# Patient Record
Sex: FEMALE | Race: OTHER | HISPANIC OR LATINO | URBAN - METROPOLITAN AREA
[De-identification: names, ages, dates, MRNs, and addresses within clinical notes are randomized per-mention and may not be internally consistent; named-entity substitution may affect disease eponyms.]

---

## 2021-03-30 ENCOUNTER — OUTPATIENT (OUTPATIENT)
Dept: INPATIENT UNIT | Facility: HOSPITAL | Age: 31
LOS: 1 days | End: 2021-03-30
Payer: MEDICAID

## 2021-03-30 VITALS
SYSTOLIC BLOOD PRESSURE: 123 MMHG | HEART RATE: 120 BPM | DIASTOLIC BLOOD PRESSURE: 72 MMHG | TEMPERATURE: 99 F | RESPIRATION RATE: 20 BRPM

## 2021-03-30 VITALS — SYSTOLIC BLOOD PRESSURE: 109 MMHG | HEART RATE: 82 BPM | DIASTOLIC BLOOD PRESSURE: 71 MMHG

## 2021-03-30 DIAGNOSIS — O47.1 FALSE LABOR AT OR AFTER 37 COMPLETED WEEKS OF GESTATION: ICD-10-CM

## 2021-03-30 PROCEDURE — 59025 FETAL NON-STRESS TEST: CPT

## 2021-03-30 PROCEDURE — G0463: CPT

## 2021-03-30 NOTE — OB PROVIDER TRIAGE NOTE - NSHPPHYSICALEXAM_GEN_ALL_CORE
T(C): 37.0 (03-30-21 @ 05:11), Max: 37 (03-30-21 @ 05:07)  HR: 104 (03-30-21 @ 05:33) (94 - 120)  BP: 123/72 (03-30-21 @ 05:11) (123/72 - 123/72)  RR: 18 (03-30-21 @ 05:11) (18 - 20)  SpO2: 98% (03-30-21 @ 05:33) (97% - 98%)    Gen: NAD, well-appearing  Heart: RRR  Lungs: CTAB  Abd: soft, gravid  Ext: non-edematous, non-tender   SVE: 2/30/-3  Bedside sono: vertex, posterior placenta, CASE 9.29, gross fetal movement, fetal breathing   FHT: baseline 140s, moderate variability, -accels, -decels   [place]: uterine irritability

## 2021-03-30 NOTE — OB PROVIDER TRIAGE NOTE - NSOBPROVIDERNOTE_OBGYN_ALL_OB_FT
A/P: 31y  at 37.2 weeks GA who is being evaluated for rule out labor.   -Tachycardic, but otherwise VSS  -SVE /-3, will re-examine in 2 hours  -CASE 9.29  -Uterine irritability on toco  -PO hydration, declines analgesia at this time     Dispo: Continue to observe.     Discussed with Dr. Loving. A/P: 31y  at 37.2 weeks GA who is being evaluated for rule out labor.   -Tachycardic, but otherwise VSS  -SVE /-3, unchanged after 1 hour   -Reactive NST, CASE 9.29  -Uterine irritability on toco  -PO hydration, declines analgesia at this time     Dispo: Patient is stable for discharge to home with outpatient follow up on Thursday. Differential diagnoses discussed with the patient. Return precautions given. Patient verbalized understanding and agreement with plan.     Discussed with Dr. Loving.

## 2021-03-30 NOTE — OB PROVIDER TRIAGE NOTE - HISTORY OF PRESENT ILLNESS
RISA AGUILAR is a 31y  at 37.2 weeks GA who presents to L&D for contractions. Contractions began on Friday, have been increasing in intensity, but not frequency, and are happening irregularly.  Pt denies vaginal bleeding and leakage of fluid. She endorses good fetal movement. She denies fevers, chills, nausea, vomiting.   Pregnancy course is  uncomplicated.     POB:   G1 02, full term uncomplicated    G2 17, full term uncomplicated    PGYN: -fibroids/-cysts, denied STD hx, denies abnormal PAPs  PMH: Denies  PSH: Denies  SH: Denies tobacco use, EtOH use and illicit drug use during the pregnancy; Feels safe at home  Meds: PNVs   All: NKDA

## 2021-04-10 ENCOUNTER — OUTPATIENT (OUTPATIENT)
Dept: OUTPATIENT SERVICES | Facility: HOSPITAL | Age: 31
LOS: 1 days | End: 2021-04-10
Payer: MEDICAID

## 2021-04-10 VITALS
SYSTOLIC BLOOD PRESSURE: 124 MMHG | TEMPERATURE: 99 F | DIASTOLIC BLOOD PRESSURE: 82 MMHG | HEART RATE: 88 BPM | RESPIRATION RATE: 20 BRPM

## 2021-04-10 VITALS — DIASTOLIC BLOOD PRESSURE: 65 MMHG | HEART RATE: 110 BPM | SYSTOLIC BLOOD PRESSURE: 112 MMHG

## 2021-04-10 DIAGNOSIS — O47.1 FALSE LABOR AT OR AFTER 37 COMPLETED WEEKS OF GESTATION: ICD-10-CM

## 2021-04-10 PROCEDURE — G0463: CPT

## 2021-04-10 PROCEDURE — 59025 FETAL NON-STRESS TEST: CPT

## 2021-04-10 RX ORDER — MICONAZOLE NITRATE 2 %
1 CREAM (GRAM) TOPICAL
Qty: 15 | Refills: 0
Start: 2021-04-10 | End: 2021-04-12

## 2021-04-10 NOTE — OB PROVIDER TRIAGE NOTE - NSOBPROVIDERNOTE_OBGYN_ALL_OB_FT
RISA AGUILAR is a 31y  at 38w6d GA; r/o ROM, r/o labor    -SVE virtually unchanged from last triage visit 10 days prior and mild pain with irregular contractions; not likely in labor  -nitrizine negative and with thick discharge likely yeast infection; monistat 3 sent to pharmacy  -has f/u in clinic tomorrow  for monitoring and scheduling of induction of labor   -dispo: home with precautions    discussed with attending Dr Chavez

## 2021-04-10 NOTE — OB PROVIDER TRIAGE NOTE - HISTORY OF PRESENT ILLNESS
RISA AGUILAR is a 31y  at 38w6d GA who presents to L&D for contractions as well as leakage of fluid. Contractions are irregular, started last night around 6pm, pain 3/10. Believes she may have lost her mucus plug overnight, with some leakage of clear fluid and with thick white discharge.    Pt denies vaginal bleeding. She endorses good fetal movement. She denies fevers, chills, nausea, vomiting.     Pregnancy course is  uncomplicated.     POB:   G1 02, full term uncomplicated    G2 17, full term uncomplicated      PGYN: -fibroids/-cysts, denied STD hx, denies abnormal PAPs  PMH: Denies  PSH: Denies  SH: Denies tobacco use, EtOH use and illicit drug use during the pregnancy; Feels safe at home  Meds: PNVs   All: NKDA      Vital Signs Last 24 Hrs  T(C): 37.1 (10 Apr 2021 19:32), Max: 37.1 (10 Apr 2021 19:26)  T(F): 98.78 (10 Apr 2021 19:32), Max: 98.8 (10 Apr 2021 19:26)  HR: 110 (10 Apr 2021 20:08) (88 - 110)  BP: 112/65 (10 Apr 2021 20:08) (112/65 - 124/82)  RR: 20 (10 Apr 2021 19:32) (20 - 20)    Gen: no acute distress  CV: regular rate and ryhthmn   Pulm: clear bilaterally to auscultation  Abd: nontender; gravid  Pelvic: thick white discharge in vaginal vault; scant fluid without leakage upon valsalva; nitrizine negative  Ext: no calf tenderness; +1 swelling     Tracing: baseline 150, moderate variability, +accels, no decels  [place]: Ctx q3-6 minutes  SVE: 1-2/30/-3

## 2021-04-11 ENCOUNTER — TRANSCRIPTION ENCOUNTER (OUTPATIENT)
Age: 31
End: 2021-04-11

## 2021-04-11 ENCOUNTER — INPATIENT (INPATIENT)
Facility: HOSPITAL | Age: 31
LOS: 1 days | Discharge: HOME | End: 2021-04-13
Attending: OBSTETRICS & GYNECOLOGY | Admitting: OBSTETRICS & GYNECOLOGY
Payer: MEDICAID

## 2021-04-11 VITALS
SYSTOLIC BLOOD PRESSURE: 133 MMHG | RESPIRATION RATE: 18 BRPM | TEMPERATURE: 98 F | DIASTOLIC BLOOD PRESSURE: 84 MMHG | WEIGHT: 190.92 LBS | HEART RATE: 97 BPM | HEIGHT: 64 IN

## 2021-04-11 DIAGNOSIS — O47.1 FALSE LABOR AT OR AFTER 37 COMPLETED WEEKS OF GESTATION: ICD-10-CM

## 2021-04-11 PROBLEM — Z78.9 OTHER SPECIFIED HEALTH STATUS: Chronic | Status: ACTIVE | Noted: 2021-03-30

## 2021-04-11 LAB
ABO RH CONFIRMATION: SIGNIFICANT CHANGE UP
BASOPHILS # BLD AUTO: 0.06 K/UL — SIGNIFICANT CHANGE UP (ref 0–0.2)
BASOPHILS NFR BLD AUTO: 0.6 % — SIGNIFICANT CHANGE UP (ref 0–2)
BLD GP AB SCN SERPL QL: SIGNIFICANT CHANGE UP
COVID-19 SPIKE DOMAIN AB INTERP: POSITIVE
COVID-19 SPIKE DOMAIN ANTIBODY RESULT: 171 U/ML — HIGH
EOSINOPHIL # BLD AUTO: 0.41 K/UL — SIGNIFICANT CHANGE UP (ref 0–0.5)
EOSINOPHIL NFR BLD AUTO: 4.3 % — SIGNIFICANT CHANGE UP (ref 0–6)
HCT VFR BLD CALC: 34 % — LOW (ref 34.5–45)
HCT VFR BLD CALC: 35.3 % — SIGNIFICANT CHANGE UP (ref 34.5–45)
HGB BLD-MCNC: 10.8 G/DL — LOW (ref 11.5–15.5)
HGB BLD-MCNC: 10.9 G/DL — LOW (ref 11.5–15.5)
HIV 1 & 2 AB SERPL IA.RAPID: SIGNIFICANT CHANGE UP
HIV 1+2 AB+HIV1 P24 AG SERPL QL IA: SIGNIFICANT CHANGE UP
IMM GRANULOCYTES NFR BLD AUTO: 0.3 % — SIGNIFICANT CHANGE UP (ref 0–1.5)
LYMPHOCYTES # BLD AUTO: 2.52 K/UL — SIGNIFICANT CHANGE UP (ref 1–3.3)
LYMPHOCYTES # BLD AUTO: 26.3 % — SIGNIFICANT CHANGE UP (ref 13–44)
MCHC RBC-ENTMCNC: 24.4 PG — LOW (ref 27–34)
MCHC RBC-ENTMCNC: 24.5 PG — LOW (ref 27–34)
MCHC RBC-ENTMCNC: 30.9 GM/DL — LOW (ref 32–36)
MCHC RBC-ENTMCNC: 31.8 GM/DL — LOW (ref 32–36)
MCV RBC AUTO: 76.9 FL — LOW (ref 80–100)
MCV RBC AUTO: 79.3 FL — LOW (ref 80–100)
MONOCYTES # BLD AUTO: 0.72 K/UL — SIGNIFICANT CHANGE UP (ref 0–0.9)
MONOCYTES NFR BLD AUTO: 7.5 % — SIGNIFICANT CHANGE UP (ref 2–14)
NEUTROPHILS # BLD AUTO: 5.85 K/UL — SIGNIFICANT CHANGE UP (ref 1.8–7.4)
NEUTROPHILS NFR BLD AUTO: 61 % — SIGNIFICANT CHANGE UP (ref 43–77)
PLATELET # BLD AUTO: 392 K/UL — SIGNIFICANT CHANGE UP (ref 150–400)
PLATELET # BLD AUTO: 400 K/UL — SIGNIFICANT CHANGE UP (ref 150–400)
RBC # BLD: 4.42 M/UL — SIGNIFICANT CHANGE UP (ref 3.8–5.2)
RBC # BLD: 4.45 M/UL — SIGNIFICANT CHANGE UP (ref 3.8–5.2)
RBC # FLD: 13.7 % — SIGNIFICANT CHANGE UP (ref 10.3–14.5)
RBC # FLD: 13.8 % — SIGNIFICANT CHANGE UP (ref 10.3–14.5)
SARS-COV-2 IGG+IGM SERPL QL IA: 171 U/ML — HIGH
SARS-COV-2 IGG+IGM SERPL QL IA: POSITIVE
SARS-COV-2 RNA SPEC QL NAA+PROBE: SIGNIFICANT CHANGE UP
WBC # BLD: 12.61 K/UL — HIGH (ref 3.8–10.5)
WBC # BLD: 9.59 K/UL — SIGNIFICANT CHANGE UP (ref 3.8–10.5)
WBC # FLD AUTO: 12.61 K/UL — HIGH (ref 3.8–10.5)
WBC # FLD AUTO: 9.59 K/UL — SIGNIFICANT CHANGE UP (ref 3.8–10.5)

## 2021-04-11 RX ORDER — OXYCODONE HYDROCHLORIDE 5 MG/1
5 TABLET ORAL ONCE
Refills: 0 | Status: DISCONTINUED | OUTPATIENT
Start: 2021-04-11 | End: 2021-04-13

## 2021-04-11 RX ORDER — OXYTOCIN 10 UNIT/ML
333.33 VIAL (ML) INJECTION
Qty: 20 | Refills: 0 | Status: DISCONTINUED | OUTPATIENT
Start: 2021-04-11 | End: 2021-04-13

## 2021-04-11 RX ORDER — CITRIC ACID/SODIUM CITRATE 300-500 MG
30 SOLUTION, ORAL ORAL ONCE
Refills: 0 | Status: DISCONTINUED | OUTPATIENT
Start: 2021-04-11 | End: 2021-04-11

## 2021-04-11 RX ORDER — DIBUCAINE 1 %
1 OINTMENT (GRAM) RECTAL EVERY 6 HOURS
Refills: 0 | Status: DISCONTINUED | OUTPATIENT
Start: 2021-04-11 | End: 2021-04-13

## 2021-04-11 RX ORDER — LANOLIN
1 OINTMENT (GRAM) TOPICAL EVERY 6 HOURS
Refills: 0 | Status: DISCONTINUED | OUTPATIENT
Start: 2021-04-11 | End: 2021-04-13

## 2021-04-11 RX ORDER — HYDROCORTISONE 1 %
1 OINTMENT (GRAM) TOPICAL EVERY 6 HOURS
Refills: 0 | Status: DISCONTINUED | OUTPATIENT
Start: 2021-04-11 | End: 2021-04-13

## 2021-04-11 RX ORDER — SODIUM CHLORIDE 9 MG/ML
3 INJECTION INTRAMUSCULAR; INTRAVENOUS; SUBCUTANEOUS EVERY 8 HOURS
Refills: 0 | Status: DISCONTINUED | OUTPATIENT
Start: 2021-04-11 | End: 2021-04-13

## 2021-04-11 RX ORDER — ACETAMINOPHEN 500 MG
975 TABLET ORAL
Refills: 0 | Status: DISCONTINUED | OUTPATIENT
Start: 2021-04-11 | End: 2021-04-13

## 2021-04-11 RX ORDER — OXYTOCIN 10 UNIT/ML
333.33 VIAL (ML) INJECTION
Qty: 20 | Refills: 0 | Status: DISCONTINUED | OUTPATIENT
Start: 2021-04-11 | End: 2021-04-11

## 2021-04-11 RX ORDER — OXYCODONE HYDROCHLORIDE 5 MG/1
5 TABLET ORAL
Refills: 0 | Status: DISCONTINUED | OUTPATIENT
Start: 2021-04-11 | End: 2021-04-13

## 2021-04-11 RX ORDER — KETOROLAC TROMETHAMINE 30 MG/ML
30 SYRINGE (ML) INJECTION ONCE
Refills: 0 | Status: DISCONTINUED | OUTPATIENT
Start: 2021-04-11 | End: 2021-04-11

## 2021-04-11 RX ORDER — IBUPROFEN 200 MG
1 TABLET ORAL
Qty: 40 | Refills: 0
Start: 2021-04-11 | End: 2021-04-20

## 2021-04-11 RX ORDER — BENZOCAINE 10 %
1 GEL (GRAM) MUCOUS MEMBRANE EVERY 6 HOURS
Refills: 0 | Status: DISCONTINUED | OUTPATIENT
Start: 2021-04-11 | End: 2021-04-13

## 2021-04-11 RX ORDER — ACETAMINOPHEN 500 MG
3 TABLET ORAL
Qty: 120 | Refills: 0
Start: 2021-04-11 | End: 2021-04-20

## 2021-04-11 RX ORDER — IBUPROFEN 200 MG
600 TABLET ORAL EVERY 6 HOURS
Refills: 0 | Status: DISCONTINUED | OUTPATIENT
Start: 2021-04-11 | End: 2021-04-13

## 2021-04-11 RX ORDER — PRAMOXINE HYDROCHLORIDE 150 MG/15G
1 AEROSOL, FOAM RECTAL EVERY 4 HOURS
Refills: 0 | Status: DISCONTINUED | OUTPATIENT
Start: 2021-04-11 | End: 2021-04-13

## 2021-04-11 RX ORDER — IBUPROFEN 200 MG
600 TABLET ORAL EVERY 6 HOURS
Refills: 0 | Status: COMPLETED | OUTPATIENT
Start: 2021-04-11 | End: 2022-03-10

## 2021-04-11 RX ORDER — JNJ-78436735 50000000000 [PFU]/.5ML
0.5 SUSPENSION INTRAMUSCULAR ONCE
Refills: 0 | Status: DISCONTINUED | OUTPATIENT
Start: 2021-04-11 | End: 2021-04-13

## 2021-04-11 RX ORDER — DIPHENHYDRAMINE HCL 50 MG
25 CAPSULE ORAL EVERY 6 HOURS
Refills: 0 | Status: DISCONTINUED | OUTPATIENT
Start: 2021-04-11 | End: 2021-04-13

## 2021-04-11 RX ORDER — MAGNESIUM HYDROXIDE 400 MG/1
30 TABLET, CHEWABLE ORAL
Refills: 0 | Status: DISCONTINUED | OUTPATIENT
Start: 2021-04-11 | End: 2021-04-13

## 2021-04-11 RX ORDER — SIMETHICONE 80 MG/1
80 TABLET, CHEWABLE ORAL EVERY 4 HOURS
Refills: 0 | Status: DISCONTINUED | OUTPATIENT
Start: 2021-04-11 | End: 2021-04-13

## 2021-04-11 RX ORDER — AER TRAVELER 0.5 G/1
1 SOLUTION RECTAL; TOPICAL EVERY 4 HOURS
Refills: 0 | Status: DISCONTINUED | OUTPATIENT
Start: 2021-04-11 | End: 2021-04-13

## 2021-04-11 RX ORDER — SODIUM CHLORIDE 9 MG/ML
1000 INJECTION, SOLUTION INTRAVENOUS
Refills: 0 | Status: DISCONTINUED | OUTPATIENT
Start: 2021-04-11 | End: 2021-04-11

## 2021-04-11 RX ORDER — TETANUS TOXOID, REDUCED DIPHTHERIA TOXOID AND ACELLULAR PERTUSSIS VACCINE, ADSORBED 5; 2.5; 8; 8; 2.5 [IU]/.5ML; [IU]/.5ML; UG/.5ML; UG/.5ML; UG/.5ML
0.5 SUSPENSION INTRAMUSCULAR ONCE
Refills: 0 | Status: DISCONTINUED | OUTPATIENT
Start: 2021-04-11 | End: 2021-04-13

## 2021-04-11 RX ADMIN — Medication 600 MILLIGRAM(S): at 13:00

## 2021-04-11 RX ADMIN — Medication 1000 MILLIUNIT(S)/MIN: at 05:15

## 2021-04-11 RX ADMIN — Medication 1 TABLET(S): at 13:00

## 2021-04-11 RX ADMIN — SODIUM CHLORIDE 3 MILLILITER(S): 9 INJECTION INTRAMUSCULAR; INTRAVENOUS; SUBCUTANEOUS at 15:00

## 2021-04-11 RX ADMIN — Medication 975 MILLIGRAM(S): at 21:06

## 2021-04-11 RX ADMIN — Medication 30 MILLIGRAM(S): at 05:30

## 2021-04-11 RX ADMIN — Medication 975 MILLIGRAM(S): at 22:00

## 2021-04-11 RX ADMIN — SODIUM CHLORIDE 125 MILLILITER(S): 9 INJECTION, SOLUTION INTRAVENOUS at 05:15

## 2021-04-11 RX ADMIN — Medication 975 MILLIGRAM(S): at 09:25

## 2021-04-11 RX ADMIN — Medication 600 MILLIGRAM(S): at 14:00

## 2021-04-11 RX ADMIN — Medication 30 MILLIGRAM(S): at 05:13

## 2021-04-11 RX ADMIN — SODIUM CHLORIDE 3 MILLILITER(S): 9 INJECTION INTRAMUSCULAR; INTRAVENOUS; SUBCUTANEOUS at 21:06

## 2021-04-11 RX ADMIN — Medication 975 MILLIGRAM(S): at 08:25

## 2021-04-11 NOTE — DISCHARGE NOTE OB - CARE PROVIDER_API CALL
[facility],   [address] [place] [id number]  Phone: [phone]  Fax: [phone]  Follow Up Time:

## 2021-04-11 NOTE — OB PROVIDER H&P - ASSESSMENT
31y  at 39w GA presenting to L&D in active labor    -Admit to L&D  -Consent  -Admission labs  -IV fluids  -Continuous toco and fetal monitoring   -GBS: Negative, no GBS ppx required   -Analgesia: requesting epidural  -expectant management at this time; augment if needed    Discussed with Dr. Chavez

## 2021-04-11 NOTE — OB RN DELIVERY SUMMARY - NS_SEPSISRSKCALC_OBGYN_ALL_OB_FT
EOS calculated successfully. EOS Risk Factor: 0.5/1000 live births ([facility] national incidence); GA=39w;Temp=98.1; ROM=0.083; GBS='Negative'; Antibiotics='No antibiotics or any antibiotics < 2 hrs prior to birth'

## 2021-04-11 NOTE — DISCHARGE NOTE OB - CARE PLAN
Principal Discharge DX:	 (normal spontaneous vaginal delivery)  Goal:	Rapid recovery  Assessment and plan of treatment:	Patient should transition to regular activity level. Resume regular diet. Patient should follow up with her OB for a postpartum checkup 3-6 weeks after delivery. Patient should call her doctor sooner if she develops a fever or uncontrolled vaginal bleeding. Please call sooner if there are any other concerns.  Secondary Diagnosis:	Status post bilateral salpingectomy  Goal:	Rapid recovery  Assessment and plan of treatment:	Please call your provider in 1 week for wound check. Take medications as directed, regular diet, activity as tolerated. Exclusive breast feeding for the first 6 months is recommended. Nothing per vagina for 6 weeks (incl. sex, douching, etc). If you have additional concerns, please inform your provider.

## 2021-04-11 NOTE — DISCHARGE NOTE OB - PROVIDER TOKENS
FREE:[LAST:[[facility]],PHONE:[phone]:[phone]],ADDRESS:[[address] [place] [id number]]]

## 2021-04-11 NOTE — OB PROVIDER H&P - HISTORY OF PRESENT ILLNESS
RISA AGUILAR is a 31y  at 39w GA who presents to L&D for regular strong contractions 10/10 pain; Denies vaginal bleeding or fluid loss. + fetal movement, unchanged.  had been triaged 12 hours prior for contractions as well as leakage of fluid, both labor and rupture ruled out at that time.    She denies fevers, chills, nausea, vomiting.     Pregnancy course is  uncomplicated.     POB:   G1 02, full term uncomplicated    G2 17, full term uncomplicated      PGYN: -fibroids/-cysts, denied STD hx, denies abnormal PAPs  PMH: Denies  PSH: Denies  SH: Denies tobacco use, EtOH use and illicit drug use during the pregnancy; Feels safe at home  Meds: PNVs   All: NKDA

## 2021-04-11 NOTE — DISCHARGE NOTE OB - PATIENT PORTAL LINK FT
You can access the FollowMyHealth Patient Portal offered by [facility] by registering at the following website: [email or web address]. By joining [employer]s FollowMyHealth portal, you will also be able to view your health information using other applications (apps) compatible with our system.

## 2021-04-11 NOTE — OB PROVIDER H&P - ATTENDING COMMENTS
patient is 40 weeks with active labor  patient with uncomplicated prenatal care  fetal heart rate reactive with regular contractions  cervix 6cm and intact patient is 39 weeks with active labor  patient with uncomplicated prenatal care  fetal heart rate reactive with regular contractions  cervix 6cm and intact

## 2021-04-11 NOTE — OB RN PATIENT PROFILE - MATERNAL MARITAL STATUS, OB PROFILE
Patient presents to the clinic for diabetic follow-up.     Previous A1C is at goal of <8    Lab Results   Component Value Date    A1C 6.8 04/03/2019       Patient has Type 2 Diabetes  Diabetes medications: Oral Medications: Metformin - Dose: 500 MG, Time: breakfast and supper  Patient is on a daily aspirin  Patient is on a Statin.  Blood glucose tests per day 1-2 times weekly  Urine microalbumin:creatine: 04/03/2019  Foot exam DUE TODAY  Eye exam 06/07/2018    Blood pressure today of 136/72 is at the goal of <139/89 for diabetics.    Tobacco User:   History   Smoking Status     Former Smoker     Quit date: 10/1/2007   Smokeless Tobacco     Never Used     Comment: Patient occasionally uses Nicorette gum       Other concerns today:Multiple     Medication Reconciliation: complete     Frances Richardson LPN on 4/17/2019 at 2:51 PMPatient presents to the clinic for annual visit, medication discussion and review.     Medication Reconciliation: complete   Frances Richardson LPN............. April 17, 2019 2:43 PM             partnered

## 2021-04-11 NOTE — OB PROVIDER DELIVERY SUMMARY - NSPROVIDERDELIVERYNOTE_OBGYN_ALL_OB_FT
Patient felt rectal pressure and was found to be 9cm, 0 station.  AROM approx 450AM  She pushed effectively for 5 minutes.  Patient prepped and draped for delivery.  In conjunction with maternal effort, she delivered a viable female infant at 455.  Head delivered ROGE   No nuchal cord.   shoulders and body then delivered without difficulty.  cord clamped x2 and cut. Cord blood also collected for blood type.  Placenta delivered spontaneously and intact at 0457. No gross pathology, 3 vessel cord. Excellent hemostasis was achieved.  Perineum and vagina were inspected and a right periurethral laceration was noted and repaired with 3-0 chromic suture. Excellent hemostasis obtained,  EBL 16 cc, no complications.    : female, Apgars 9/9.

## 2021-04-11 NOTE — DISCHARGE NOTE OB - HOSPITAL COURSE
She is a 32yo now  who presented at 39 GA in active labor and had a normal delivery. She received a bilateral salpingectomy on PPD#1. She had a normal postpartum course and was discharged home in stable condition. Upon discharge she was voiding, tolerating PO, and ambulating.

## 2021-04-11 NOTE — OB PROVIDER H&P - NSHPPHYSICALEXAM_GEN_ALL_CORE
Vital Signs Last 24 Hrs  T(C): 37.1 (10 Apr 2021 19:32), Max: 37.1 (10 Apr 2021 19:26)  T(F): 98.78 (10 Apr 2021 19:32), Max: 98.8 (10 Apr 2021 19:26)  HR: 110 (10 Apr 2021 20:08) (88 - 110)  BP: 112/65 (10 Apr 2021 20:08) (112/65 - 124/82)  RR: 20 (10 Apr 2021 19:32) (20 - 20)    Gen: no acute distress  CV: regular rate and ryhthmn   Pulm: clear bilaterally to auscultation  Abd: nontender; gravid  Ext: no calf tenderness; +1 swelling     Tracing: baseline 140, moderate variability, no accels, no decels  [place]: Ctx q2-3 minutes  SVE: 6/90/-1

## 2021-04-11 NOTE — DISCHARGE NOTE OB - PLAN OF CARE
Rapid recovery Patient should transition to regular activity level. Resume regular diet. Patient should follow up with her OB for a postpartum checkup 3-6 weeks after delivery. Patient should call her doctor sooner if she develops a fever or uncontrolled vaginal bleeding. Please call sooner if there are any other concerns. Please call your provider in 1 week for wound check. Take medications as directed, regular diet, activity as tolerated. Exclusive breast feeding for the first 6 months is recommended. Nothing per vagina for 6 weeks (incl. sex, douching, etc). If you have additional concerns, please inform your provider.

## 2021-04-11 NOTE — DISCHARGE NOTE OB - MEDICATION SUMMARY - MEDICATIONS TO TAKE
I will START or STAY ON the medications listed below when I get home from the hospital:    acetaminophen 325 mg oral tablet  -- 3 tab(s) by mouth every 6 hours, As Needed -for mild pain MDD:4,000 mg  -- Indication: For moderate pain    ibuprofen 600 mg oral tablet  -- 1 tab(s) by mouth every 6 hours, As Needed -for mild pain   -- Indication: For moderate pain    Prenatal Multivitamins with Folic Acid 1 mg oral tablet  -- 1 tab(s) by mouth once a day  -- Indication: For postpartum recovery

## 2021-04-12 LAB
MEV IGG SER-ACNC: 45.7 AU/ML — SIGNIFICANT CHANGE UP
MEV IGG+IGM SER-IMP: POSITIVE — SIGNIFICANT CHANGE UP
T PALLIDUM AB TITR SER: NEGATIVE — SIGNIFICANT CHANGE UP

## 2021-04-12 RX ORDER — ONDANSETRON 8 MG/1
4 TABLET, FILM COATED ORAL ONCE
Refills: 0 | Status: DISCONTINUED | OUTPATIENT
Start: 2021-04-12 | End: 2021-04-13

## 2021-04-12 RX ORDER — FENTANYL CITRATE 50 UG/ML
25 INJECTION INTRAVENOUS
Refills: 0 | Status: DISCONTINUED | OUTPATIENT
Start: 2021-04-12 | End: 2021-04-12

## 2021-04-12 RX ORDER — CITRIC ACID/SODIUM CITRATE 300-500 MG
30 SOLUTION, ORAL ORAL ONCE
Refills: 0 | Status: COMPLETED | OUTPATIENT
Start: 2021-04-12 | End: 2021-04-12

## 2021-04-12 RX ORDER — SODIUM CHLORIDE 9 MG/ML
1000 INJECTION, SOLUTION INTRAVENOUS
Refills: 0 | Status: DISCONTINUED | OUTPATIENT
Start: 2021-04-12 | End: 2021-04-13

## 2021-04-12 RX ORDER — SODIUM CHLORIDE 9 MG/ML
1000 INJECTION, SOLUTION INTRAVENOUS ONCE
Refills: 0 | Status: COMPLETED | OUTPATIENT
Start: 2021-04-12 | End: 2021-04-12

## 2021-04-12 RX ADMIN — Medication 600 MILLIGRAM(S): at 18:10

## 2021-04-12 RX ADMIN — FENTANYL CITRATE 25 MICROGRAM(S): 50 INJECTION INTRAVENOUS at 12:08

## 2021-04-12 RX ADMIN — Medication 975 MILLIGRAM(S): at 03:22

## 2021-04-12 RX ADMIN — OXYCODONE HYDROCHLORIDE 5 MILLIGRAM(S): 5 TABLET ORAL at 16:34

## 2021-04-12 RX ADMIN — SODIUM CHLORIDE 3 MILLILITER(S): 9 INJECTION INTRAMUSCULAR; INTRAVENOUS; SUBCUTANEOUS at 21:46

## 2021-04-12 RX ADMIN — Medication 975 MILLIGRAM(S): at 21:45

## 2021-04-12 RX ADMIN — SODIUM CHLORIDE 3 MILLILITER(S): 9 INJECTION INTRAMUSCULAR; INTRAVENOUS; SUBCUTANEOUS at 06:30

## 2021-04-12 RX ADMIN — Medication 600 MILLIGRAM(S): at 18:40

## 2021-04-12 RX ADMIN — Medication 30 MILLILITER(S): at 08:10

## 2021-04-12 RX ADMIN — SODIUM CHLORIDE 1000 MILLILITER(S): 9 INJECTION, SOLUTION INTRAVENOUS at 07:30

## 2021-04-12 RX ADMIN — SODIUM CHLORIDE 3 MILLILITER(S): 9 INJECTION INTRAMUSCULAR; INTRAVENOUS; SUBCUTANEOUS at 15:16

## 2021-04-12 RX ADMIN — FENTANYL CITRATE 25 MICROGRAM(S): 50 INJECTION INTRAVENOUS at 11:15

## 2021-04-12 RX ADMIN — SODIUM CHLORIDE 125 MILLILITER(S): 9 INJECTION, SOLUTION INTRAVENOUS at 11:21

## 2021-04-12 RX ADMIN — Medication 600 MILLIGRAM(S): at 06:25

## 2021-04-12 RX ADMIN — FENTANYL CITRATE 25 MICROGRAM(S): 50 INJECTION INTRAVENOUS at 11:59

## 2021-04-12 RX ADMIN — Medication 975 MILLIGRAM(S): at 22:30

## 2021-04-12 RX ADMIN — FENTANYL CITRATE 25 MICROGRAM(S): 50 INJECTION INTRAVENOUS at 11:51

## 2021-04-12 RX ADMIN — FENTANYL CITRATE 25 MICROGRAM(S): 50 INJECTION INTRAVENOUS at 11:06

## 2021-04-12 RX ADMIN — Medication 975 MILLIGRAM(S): at 04:00

## 2021-04-12 RX ADMIN — OXYCODONE HYDROCHLORIDE 5 MILLIGRAM(S): 5 TABLET ORAL at 16:04

## 2021-04-12 RX ADMIN — Medication 600 MILLIGRAM(S): at 06:55

## 2021-04-12 RX ADMIN — FENTANYL CITRATE 25 MICROGRAM(S): 50 INJECTION INTRAVENOUS at 11:16

## 2021-04-12 RX ADMIN — Medication 600 MILLIGRAM(S): at 23:53

## 2021-04-12 RX ADMIN — FENTANYL CITRATE 25 MICROGRAM(S): 50 INJECTION INTRAVENOUS at 11:19

## 2021-04-12 NOTE — OB RN INTRAOPERATIVE NOTE - NSOBSELHIDDEN_OBGYN_ALL_OB_FT
[NSOBAttendingProcedure1_OBGYN_ALL_OB_FT:[id number]],[NSRNCirculatorProcedure1_OBGYN_ALL_OB_FT:[id number]=]

## 2021-04-12 NOTE — OB RN INTRAOPERATIVE NOTE - NSELECTROSURGICALUNITBIOMEDNUMBER1_OBGYN_ALL_OB_FT
Called and spoke with patient about PT and OT orders.  Informed patient that the [place] location does not offer driving tests, however, [place] and [place] locations do.  Patient informed he may complete his OT and PT here if he prefers.  Patient states he would prefer [place].  Patient offered to schedule evaluations at time of call, but states he'd rather have his wife call.  Patient given clinic number.  
[phone]

## 2021-04-12 NOTE — PROGRESS NOTE ADULT - ATTENDING COMMENTS
Post  day # 1  satisfied parity  no complaints  exam wnl  hb 10    Plan  NPo for BTL  other routine care  discussed vaccination, breastfeeding

## 2021-04-12 NOTE — PROGRESS NOTE ADULT - ASSESSMENT
A/P:  RISA AGUILAR is a 31y  s/p  PPD# 1 of a viable female infant.  - VSS.   - Pain is well controlled  - Tolerating PO intake: NPO for tubal  - Normal bowel function  - Bleeding wnl  - Rh +  - Dispo: Continued post partum care. For tubal today. A/P:  RISA AGUILAR is a 31y  s/p  PPD# 1 of a viable female infant.  - VSS.   - Pain is well controlled  - Tolerating PO intake: NPO for tubal  - Normal bowel function  - Bleeding wnl  - Rh +  - Dispo: Continued post partum care. For tubal today.      PGY4 Addendum: Patient seen and examined at bedside. Agree with the above

## 2021-04-13 ENCOUNTER — RESULT REVIEW (OUTPATIENT)
Age: 31
End: 2021-04-13

## 2021-04-13 VITALS
RESPIRATION RATE: 18 BRPM | OXYGEN SATURATION: 99 % | DIASTOLIC BLOOD PRESSURE: 75 MMHG | SYSTOLIC BLOOD PRESSURE: 128 MMHG | HEART RATE: 82 BPM | TEMPERATURE: 98 F

## 2021-04-13 PROCEDURE — 86765 RUBEOLA ANTIBODY: CPT

## 2021-04-13 PROCEDURE — 86901 BLOOD TYPING SEROLOGIC RH(D): CPT

## 2021-04-13 PROCEDURE — 86703 HIV-1/HIV-2 1 RESULT ANTBDY: CPT

## 2021-04-13 PROCEDURE — U0003: CPT

## 2021-04-13 PROCEDURE — 59050 FETAL MONITOR W/REPORT: CPT

## 2021-04-13 PROCEDURE — G0463: CPT

## 2021-04-13 PROCEDURE — 88302 TISSUE EXAM BY PATHOLOGIST: CPT

## 2021-04-13 PROCEDURE — 59025 FETAL NON-STRESS TEST: CPT

## 2021-04-13 PROCEDURE — 88302 TISSUE EXAM BY PATHOLOGIST: CPT | Mod: 26

## 2021-04-13 PROCEDURE — 86780 TREPONEMA PALLIDUM: CPT

## 2021-04-13 PROCEDURE — 86900 BLOOD TYPING SEROLOGIC ABO: CPT

## 2021-04-13 PROCEDURE — U0005: CPT

## 2021-04-13 PROCEDURE — 86769 SARS-COV-2 COVID-19 ANTIBODY: CPT

## 2021-04-13 PROCEDURE — 36415 COLL VENOUS BLD VENIPUNCTURE: CPT

## 2021-04-13 PROCEDURE — 86850 RBC ANTIBODY SCREEN: CPT

## 2021-04-13 PROCEDURE — 85027 COMPLETE CBC AUTOMATED: CPT

## 2021-04-13 PROCEDURE — 87389 HIV-1 AG W/HIV-1&-2 AB AG IA: CPT

## 2021-04-13 PROCEDURE — 85025 COMPLETE CBC W/AUTO DIFF WBC: CPT

## 2021-04-13 RX ADMIN — Medication 600 MILLIGRAM(S): at 12:01

## 2021-04-13 RX ADMIN — Medication 600 MILLIGRAM(S): at 06:45

## 2021-04-13 RX ADMIN — Medication 1 TABLET(S): at 12:02

## 2021-04-13 RX ADMIN — Medication 975 MILLIGRAM(S): at 09:00

## 2021-04-13 RX ADMIN — Medication 600 MILLIGRAM(S): at 06:10

## 2021-04-13 RX ADMIN — Medication 975 MILLIGRAM(S): at 04:15

## 2021-04-13 RX ADMIN — Medication 600 MILLIGRAM(S): at 12:29

## 2021-04-13 RX ADMIN — Medication 975 MILLIGRAM(S): at 10:00

## 2021-04-13 RX ADMIN — Medication 975 MILLIGRAM(S): at 03:35

## 2021-04-13 RX ADMIN — Medication 600 MILLIGRAM(S): at 00:30

## 2021-04-13 NOTE — PROGRESS NOTE ADULT - ASSESSMENT
A/P:  IRSA AGUILAR is a 31y  s/p  PPD# 2 of a viable female infant. s/P bilateral salpingectomy POD#1.  - VSS.   - Pain is well controlled  - Tolerating PO intake  - Normal bowel function  - Bleeding wnl  - Rh +  - Dispo: Home pending attending approval.      A/P:  RISA AGUILAR is a 31y  s/p  PPD# 2 of a viable female infant. s/P bilateral salpingectomy POD#1.  - VSS.   - Pain is well controlled  - Tolerating PO intake  - Normal bowel function  - Bleeding wnl  - Rh +  - Dispo: Home pending attending approval.     PGY4 Addendum: Patient seen and examined at bedside. Agree with the above

## 2021-04-13 NOTE — PROGRESS NOTE ADULT - SUBJECTIVE AND OBJECTIVE BOX
Name: RISA AGUILAR  MRN: [id number]  Date Admitted: 21  Location: [facility]EST 2001 ([facility]  Attending: Pili Chavez Tia      Post Partum: Vaginal Delivery Progress Note    RISA AGUILAR is a 31y  s/p  PPD# 2 of a viable female infant. s/P bilateral salpingectomy POD#1.    SUBJECTIVE:  No acute events overnight. Pain is well controlled with PRN pain medication. No problems with ambulating, voiding, or PO intake. Has had flatus but no BM. Denies N/V. Patient is having normal lochia which is decreasing.    She is breastfeeding and the baby is latching on.     OBJECTIVE:    Vital Signs Last 24 Hrs  T(C): 36.7 (2021 03:40), Max: 37.1 (2021 12:45)  T(F): 98.1 (2021 03:40), Max: 98.7 (2021 12:45)  HR: 82 (2021 03:40) (59 - 99)  BP: 128/75 (2021 03:40) (110/62 - 140/74)  RR: 18 (2021 03:40) (14 - 19)  SpO2: 99% (2021 03:40) (97% - 100%)      Physical exam:  General: AOx3, NAD.  Heart: RRR. S1S2.  Lungs: CTABL. Good airflow bilaterally.   Abdomen: +BS, Soft, appropriately tender to palpitation, firm uterine fundus at umbilicus. Incision: C/D/I  Pelvic: Lochia normal  Ext: No DVT signs, warm extremities.        LABS:                        10.9   12.61 )-----------( 392      ( 2021 18:33 )             35.3               
Name: RISA AGUILAR  MRN: [id number]  Date Admitted: 21  Location: [facility]EST [facility] ([facility]  Attending: Pili Chavez Tia      Post Partum: Vaginal Delivery Progress Note    RISA AGUILAR is a 31y  s/p  PPD# 1 of a viable female infant.     SUBJECTIVE:  No acute events overnight. Pain is well controlled with PRN pain medication. No problems with ambulating, voiding, or PO intake. Has had flatus but no BM. Denies N/V. Patient is having normal lochia which is decreasing.    She is breastfeeding and the baby is latching on.     OBJECTIVE:    Vital Signs Last 24 Hrs  T(C): 37 (2021 04:04), Max: 37 (2021 04:04)  T(F): 98.6 (2021 04:04), Max: 98.6 (2021 04:04)  HR: 87 (2021 04:04) (72 - 98)  BP: 134/84 (2021 04:04) (116/76 - 149/87)  RR: 16 (2021 04:04) (16 - 18)  SpO2: 99% (2021 04:04) (98% - 99%)      Physical exam:  General: AOx3, NAD.  Heart: RRR. S1S2.  Lungs: CTABL. Good airflow bilaterally.   Abdomen: +BS, Soft, appropriately tender to palpitation, firm uterine fundus at umbilicus.  Pelvic: Lochia normal  Ext: No DVT signs, warm extremities.        LABS:                        10.9   12.61 )-----------( 392      ( 2021 18:33 )             35.3               
S: Patient doing well. Minimal lochia. No complaints.     O: Vital Signs Last 24 Hrs  T(C): 36.7 (2021 03:40), Max: 37.1 (2021 12:45)  T(F): 98.1 (2021 03:40), Max: 98.7 (2021 12:45)  HR: 82 (2021 03:40) (59 - 99)  BP: 128/75 (2021 03:40) (110/62 - 140/74)  BP(mean): --  RR: 18 (2021 03:40) (14 - 19)  SpO2: 99% (2021 03:40) (97% - 100%)    Gen: NAD  Breast : breast feeding  Abd: soft, NT, ND, fundus firm below umbilicus. Incision dry.  Lochia mild, voiding well  Ext: no tenderness    Labs:                        10.9   12.61 )-----------( 392      ( 2021 18:33 )             35.3            PP # 2 s/p     Doing well.  Discharge hometoday  Nothing in vagina and no heavy lifting for 6 weeks.   Follow up 1 weeks for post partum visit for incision check  Call office for any fevers, chills, heavy vaginal bleeding, symptoms of depression, or any other concerning symptoms.  Continue motrin 600 mg every 6 hours.

## 2021-04-15 LAB — SURGICAL PATHOLOGY STUDY: SIGNIFICANT CHANGE UP

## 2021-05-17 ENCOUNTER — APPOINTMENT (OUTPATIENT)
Dept: DISASTER EMERGENCY | Facility: OTHER | Age: 31
End: 2021-05-17

## 2021-09-22 NOTE — OB RN PATIENT PROFILE - NSLDARRIVAL_OBGYN_ALL_OB_START_DATE
Discussed with the patient and brother and all questioned fully answered. They will call the hospital if any problems arise. 11-Apr-2021 03:44

## 2022-11-20 NOTE — OB RN TRIAGE NOTE - NS_TRIAGETIMEOFMEDICALSCREENING_OBGYN_ALL_OB_DT
Continue  home dose of trazodone 150mg PO qhs.   Continue  home bupropion 150mg PO q daily. 10-Apr-2021 19:47

## 2022-12-31 ENCOUNTER — OFFICE (OUTPATIENT)
Dept: URBAN - METROPOLITAN AREA CLINIC 63 | Facility: CLINIC | Age: 32
Setting detail: OPHTHALMOLOGY
End: 2022-12-31
Payer: COMMERCIAL

## 2022-12-31 DIAGNOSIS — H52.13: ICD-10-CM

## 2022-12-31 DIAGNOSIS — H16.223: ICD-10-CM

## 2022-12-31 PROCEDURE — 92015 DETERMINE REFRACTIVE STATE: CPT | Performed by: STUDENT IN AN ORGANIZED HEALTH CARE EDUCATION/TRAINING PROGRAM

## 2022-12-31 PROCEDURE — 92004 COMPRE OPH EXAM NEW PT 1/>: CPT | Performed by: STUDENT IN AN ORGANIZED HEALTH CARE EDUCATION/TRAINING PROGRAM

## 2022-12-31 ASSESSMENT — REFRACTION_CURRENTRX
OD_CYLINDER: -2.00
OD_OVR_VA: 20/
OD_AXIS: 086
OD_VPRISM_DIRECTION: SV
OS_AXIS: 085
OS_VPRISM_DIRECTION: SV
OS_CYLINDER: -2.00
OS_OVR_VA: 20/
OS_SPHERE: -2.00
OD_SPHERE: -2.25

## 2022-12-31 ASSESSMENT — SPHEQUIV_DERIVED
OD_SPHEQUIV: -2.375
OS_SPHEQUIV: -2.75
OD_SPHEQUIV: -2.875
OS_SPHEQUIV: -2.5

## 2022-12-31 ASSESSMENT — SUPERFICIAL PUNCTATE KERATITIS (SPK)
OD_SPK: 1+
OS_SPK: 1+

## 2022-12-31 ASSESSMENT — REFRACTION_MANIFEST
OD_AXIS: 085
OS_CYLINDER: -2.50
OD_SPHERE: -1.75
OS_AXIS: 075
OD_CYLINDER: -2.25
OS_SPHERE: -1.50

## 2022-12-31 ASSESSMENT — REFRACTION_AUTOREFRACTION
OS_SPHERE: -1.25
OD_SPHERE: -1.25
OD_AXIS: 086
OD_CYLINDER: -2.25
OS_AXIS: 078
OS_CYLINDER: -2.50

## 2022-12-31 ASSESSMENT — TONOMETRY
OS_IOP_MMHG: 18
OD_IOP_MMHG: 19

## 2022-12-31 ASSESSMENT — LID EXAM ASSESSMENTS
OD_MEIBOMITIS: RLL 1+
OS_MEIBOMITIS: LLL 1+

## 2022-12-31 ASSESSMENT — CONFRONTATIONAL VISUAL FIELD TEST (CVF)
OD_FINDINGS: FULL
OS_FINDINGS: FULL

## 2022-12-31 ASSESSMENT — VISUAL ACUITY
OD_BCVA: 20/25
OS_BCVA: 20/25
